# Patient Record
Sex: FEMALE | Race: WHITE | NOT HISPANIC OR LATINO | Employment: FULL TIME | URBAN - METROPOLITAN AREA
[De-identification: names, ages, dates, MRNs, and addresses within clinical notes are randomized per-mention and may not be internally consistent; named-entity substitution may affect disease eponyms.]

---

## 2020-02-07 ENCOUNTER — HOSPITAL ENCOUNTER (EMERGENCY)
Facility: OTHER | Age: 45
Discharge: HOME | End: 2020-02-07
Attending: EMERGENCY MEDICINE
Payer: COMMERCIAL

## 2020-02-07 VITALS
TEMPERATURE: 98 F | RESPIRATION RATE: 16 BRPM | HEIGHT: 67 IN | OXYGEN SATURATION: 95 % | BODY MASS INDEX: 39.71 KG/M2 | WEIGHT: 253 LBS | SYSTOLIC BLOOD PRESSURE: 118 MMHG | HEART RATE: 80 BPM | DIASTOLIC BLOOD PRESSURE: 57 MMHG

## 2020-02-07 DIAGNOSIS — T78.40XA ALLERGIC REACTION, INITIAL ENCOUNTER: Primary | ICD-10-CM

## 2020-02-07 PROCEDURE — 99284 EMERGENCY DEPT VISIT MOD MDM: CPT

## 2020-02-07 RX ORDER — PREDNISONE 20 MG/1
60 TABLET ORAL DAILY
Qty: 12 TABLET | Refills: 0 | Status: SHIPPED | OUTPATIENT
Start: 2020-02-07 | End: 2020-02-11

## 2020-02-07 RX ORDER — EPINEPHRINE 0.3 MG/.3ML
1 INJECTION SUBCUTANEOUS
Qty: 2 EACH | Refills: 1 | Status: SHIPPED | OUTPATIENT
Start: 2020-02-07 | End: 2021-02-06

## 2020-02-08 NOTE — ED PROVIDER NOTES
"Encounter Date: 2020    SCRIBE #1 NOTE: I, Jessica Pro, am scribing for, and in the presence of, Dr. Strange.       History     Chief Complaint   Patient presents with    Allergic Reaction     pt came to the ed tonight c.o. allergic reaction s/p eating shellfish, pt given 125mg solumedrol and 50mg benadryl     Time seen by provider: 9:09 PM    This is a 44 y.o. female who presents via EMS with complaint of allergic reaction s/p consuming seafood about an hour and a half ago. Patient states that she accidentally ate french fries that were fried in the same oil as seafood. She reports immediate onset of "itchy" rash to arms and legs, nausea, "swelling" and SOB. Patient notes that she went to [facility] and purchased benadryl and pepcid. Patient reports symptoms are improving after taking benadryl and pepcid and receiving solumedrol and benadryl en route. Patient currently reports "itchy and splotchy" rash to arms and legs. She denies SOB at this time. Patient reports history of anaphylaxis but never required intubation. She notes she typically carries an EpiPen with her, but she accidentally left it in her hotel room.     The history is provided by the patient.     Review of patient's allergies indicates:   Allergen Reactions    Shellfish containing products Anaphylaxis    Codeine Nausea And Vomiting    Mushroom      Past Medical History:   Diagnosis Date    GERD (gastroesophageal reflux disease)     Guillain Barré syndrome      Past Surgical History:   Procedure Laterality Date     SECTION      HYSTERECTOMY      SHOULDER DEBRIDEMENT      TONSILLECTOMY       History reviewed. No pertinent family history.  Social History     Tobacco Use    Smoking status: Never Smoker   Substance Use Topics    Alcohol use: Not Currently    Drug use: Never     Review of Systems   Constitutional: Negative for diaphoresis and fever.   HENT: Positive for facial swelling. Negative for sore throat.    Respiratory: " Positive for shortness of breath.    Cardiovascular: Negative for chest pain.   Gastrointestinal: Positive for nausea. Negative for vomiting.   Genitourinary: Negative for dysuria.   Musculoskeletal: Negative for back pain.   Skin: Positive for rash.   Neurological: Negative for weakness.   Hematological: Does not bruise/bleed easily.       Physical Exam     Initial Vitals [02/07/20 2105]   BP Pulse Resp Temp SpO2   121/86 96 12 98.2 °F (36.8 °C) 97 %      MAP       --         Physical Exam    Nursing note and vitals reviewed.  Constitutional: She appears well-developed and well-nourished. She is cooperative.  Non-toxic appearance. No distress.   HENT:   Head: Normocephalic and atraumatic.   Mouth/Throat: Oropharynx is clear and moist.   Eyes: Conjunctivae and EOM are normal. Pupils are equal, round, and reactive to light.   Neck: Normal range of motion and full passive range of motion without pain. Neck supple. No thyromegaly present.   Cardiovascular: Normal rate, regular rhythm, normal heart sounds and normal pulses. Exam reveals no gallop and no friction rub.    No murmur heard.  Pulmonary/Chest: Effort normal and breath sounds normal. No respiratory distress. She has no wheezes. She has no rhonchi. She has no rales.   Abdominal: Soft. Normal appearance and bowel sounds are normal. There is no tenderness.   Musculoskeletal: Normal range of motion.   Neurological: She is alert and oriented to person, place, and time. She has normal strength. No cranial nerve deficit or sensory deficit.   Skin: Skin is warm, dry and intact. No rash noted.   Psychiatric: She has a normal mood and affect. Her speech is normal and behavior is normal. Judgment and thought content normal.         ED Course   Procedures  Labs Reviewed - No data to display       Imaging Results    None          Medical Decision Making:   Initial Assessment:   44-year-old female with allergic reaction.  Has an anaphylaxis reaction to shellfish in the  past.  She felt symptoms when she was eating at a restaurant.  She took Benadryl as well as Pepcid.  She does carry an EpiPen but did not use it.  Paramedics gave her Solu-Medrol and Benadryl but no epinephrine.  Currently no signs of anaphylaxis knee nor anaphylactic shock.  Will observe the patient as she is feeling much better at this time.  Vital signs are normal. No indication for any blood work at this time.  She has already see the necessary antihistamine medication at this time.            Scribe Attestation:   Scribe #1: I performed the above scribed service and the documentation accurately describes the services I performed. I attest to the accuracy of the note.    Attending Attestation:           Physician Attestation for Scribe:  Physician Attestation Statement for Scribe #1: I, Dr. Strange, reviewed documentation, as scribed by Jessica Pro in my presence, and it is both accurate and complete.                 ED Course as of Feb 08 0701 Fri Feb 07, 2020   [id number] On re-evaluation patient states she has had a little itching that there is no rash.  Reauscultation reveals no wheezing.  No signs of anaphylaxis.  Vital signs remained stable. I feel comfortable at this time discharge the patient home.  She already has an EpiPen in her hotel room.  She also has Benadryl as well as Pepcid.  She agrees the plan of care as well as her does her daughter.    Patient discharged home in stable condition. Diagnosis and treatment plan explained to patient and/or family member who is at bedside. I have answered all questions and the patient is satisfied with the plan of care. The patient demonstrates understanding of the care plan. This is the extent to the patients complaints today here in the emergency department.    [SM]      ED Course User Index  [SM] Sylvester Strange DO                Clinical Impression:     1. Allergic reaction, initial encounter                                Sylvester Strange,  DO  02/08/20 0701

## 2020-02-08 NOTE — ED TRIAGE NOTES
Pt came to the ed tonight s/p having an allergic reaction to shellfish. Pt has a known allergy to shellfish but was unaware that her french fries where cooked in the same oil has shellfish, pt started getting mild nausea and some throat scratching. Pt called out EMS, upon arrival pt was given solumedrol and benadryl , no epi needed. Pt has relief of symptoms, no shortness of breath, lung sounds clear, and no obvious signs of throat swelling. Pt will be placed on the monitor and will be continuously monitored.